# Patient Record
Sex: FEMALE | Race: WHITE | Employment: UNEMPLOYED | URBAN - NONMETROPOLITAN AREA
[De-identification: names, ages, dates, MRNs, and addresses within clinical notes are randomized per-mention and may not be internally consistent; named-entity substitution may affect disease eponyms.]

---

## 2019-08-26 ENCOUNTER — HOSPITAL ENCOUNTER (OUTPATIENT)
Dept: NURSING | Age: 29
Discharge: HOME | End: 2019-08-26
Payer: COMMERCIAL

## 2019-08-26 VITALS
RESPIRATION RATE: 16 BRPM | DIASTOLIC BLOOD PRESSURE: 91 MMHG | OXYGEN SATURATION: 99 % | BODY MASS INDEX: 30.9 KG/M2 | SYSTOLIC BLOOD PRESSURE: 126 MMHG | TEMPERATURE: 97.4 F | WEIGHT: 180 LBS | HEART RATE: 88 BPM

## 2019-08-26 LAB
ANTIBODY SCREEN: NORMAL
GESTATIONAL AGE(WEEKS): NORMAL

## 2019-08-26 PROCEDURE — 6360000002 HC RX W HCPCS: Performed by: OBSTETRICS & GYNECOLOGY

## 2019-08-26 PROCEDURE — 96372 THER/PROPH/DIAG INJ SC/IM: CPT

## 2019-08-26 PROCEDURE — 36415 COLL VENOUS BLD VENIPUNCTURE: CPT

## 2019-08-26 PROCEDURE — 86850 RBC ANTIBODY SCREEN: CPT

## 2019-08-26 RX ADMIN — HUMAN RHO(D) IMMUNE GLOBULIN 300 MCG: 300 INJECTION, SOLUTION INTRAMUSCULAR at 10:08

## 2019-08-26 NOTE — PROGRESS NOTES
6452 pt arrives to \[facility]\"" for Rhogam injection. All questions and concerns addressed.  28 weeks gestation confirmed  0915 PATIENT RIGHTS AND RESPONSIBILITIES SHEET OFFERED TO PT TO READ.  0919 lab notified of pt arrival  302 Lin Wang lab at bedside  1000 blood bank preparing medication  1015 _m___ Safety:       (Environmental)  Alber Ro[place] to environment   Ensure ID band is correct and in place/ allergy band as needed   Assess for fall risk   Initiate fall precautions as applicable (fall band, side rails, etc.)   Call light within reach   Bed in low position/ wheels locked    __m__ Pain:        Assess pain level and characteristics   Administer analgesics as ordered   Assess effectiveness of pain management and report to MD as needed    __m__ Knowledge Deficit:   Assess baseline knowledge   Provide teaching at level of understanding   Provide teaching via preferred learning method   Evaluate teaching effectiveness    __m__ Hemodynamic/Respiratory Status:       (Pre and Post Procedure Monitoring)   Assess/Monitor vital signs and LOC   Assess Baseline SpO2 prior to any sedation   Obtain weight/height   Assess vital signs/ LOC until patient meets discharge criteria   Monitor procedure site and notify MD of any issues   )  1015 WRITTEN DISCHARGE INSTRUCTIONS GIVEN TO PT-VERBALIZES UNDERSTANDING  1020 PT DISCHARGED AMBULATORY IN SATISFACTORY CONDITION

## 2019-10-20 ENCOUNTER — HOSPITAL ENCOUNTER (OUTPATIENT)
Age: 29
Discharge: HOME | End: 2019-10-20
Attending: OBSTETRICS & GYNECOLOGY | Admitting: OBSTETRICS & GYNECOLOGY
Payer: COMMERCIAL

## 2019-10-20 VITALS
HEIGHT: 64 IN | TEMPERATURE: 97.2 F | DIASTOLIC BLOOD PRESSURE: 77 MMHG | SYSTOLIC BLOOD PRESSURE: 126 MMHG | HEART RATE: 79 BPM | OXYGEN SATURATION: 97 % | BODY MASS INDEX: 32.1 KG/M2 | WEIGHT: 188 LBS | RESPIRATION RATE: 16 BRPM

## 2019-10-20 PROBLEM — O47.9 FALSE LABOR: Status: ACTIVE | Noted: 2019-10-20

## 2019-10-20 LAB
ALT SERPL-CCNC: 10 U/L (ref 11–66)
AST SERPL-CCNC: 11 U/L (ref 5–40)
BASOPHILS # BLD: 0.2 %
BASOPHILS ABSOLUTE: 0 THOU/MM3 (ref 0–0.1)
BUN BLDV-MCNC: 5 MG/DL (ref 7–22)
CREAT SERPL-MCNC: 0.4 MG/DL (ref 0.4–1.2)
CREATININE URINE: 144 MG/DL
EOSINOPHIL # BLD: 1.2 %
EOSINOPHILS ABSOLUTE: 0.2 THOU/MM3 (ref 0–0.4)
ERYTHROCYTE [DISTWIDTH] IN BLOOD BY AUTOMATED COUNT: 13.4 % (ref 11.5–14.5)
ERYTHROCYTE [DISTWIDTH] IN BLOOD BY AUTOMATED COUNT: 45.8 FL (ref 35–45)
GFR SERPL CREATININE-BSD FRML MDRD: > 90 ML/MIN/1.73M2
HCT VFR BLD CALC: 30.1 % (ref 37–47)
HEMOGLOBIN: 9.8 GM/DL (ref 12–16)
IMMATURE GRANS (ABS): 0.28 THOU/MM3 (ref 0–0.07)
IMMATURE GRANULOCYTES: 2 %
LYMPHOCYTES # BLD: 19.8 %
LYMPHOCYTES ABSOLUTE: 2.7 THOU/MM3 (ref 1–4.8)
MCH RBC QN AUTO: 30.7 PG (ref 26–33)
MCHC RBC AUTO-ENTMCNC: 32.6 GM/DL (ref 32.2–35.5)
MCV RBC AUTO: 94.4 FL (ref 81–99)
MONOCYTES # BLD: 5.3 %
MONOCYTES ABSOLUTE: 0.7 THOU/MM3 (ref 0.4–1.3)
NUCLEATED RED BLOOD CELLS: 0 /100 WBC
PLATELET # BLD: 254 THOU/MM3 (ref 130–400)
PMV BLD AUTO: 10 FL (ref 9.4–12.4)
PROT/CREAT RATIO, UR: 0.28
PROTEIN, URINE: 39.9 MG/DL
RBC # BLD: 3.19 MILL/MM3 (ref 4.2–5.4)
SEG NEUTROPHILS: 71.5 %
SEGMENTED NEUTROPHILS ABSOLUTE COUNT: 9.9 THOU/MM3 (ref 1.8–7.7)
WBC # BLD: 13.8 THOU/MM3 (ref 4.8–10.8)

## 2019-10-20 PROCEDURE — 36415 COLL VENOUS BLD VENIPUNCTURE: CPT

## 2019-10-20 PROCEDURE — 85025 COMPLETE CBC W/AUTO DIFF WBC: CPT

## 2019-10-20 PROCEDURE — 84450 TRANSFERASE (AST) (SGOT): CPT

## 2019-10-20 PROCEDURE — 84520 ASSAY OF UREA NITROGEN: CPT

## 2019-10-20 PROCEDURE — 84460 ALANINE AMINO (ALT) (SGPT): CPT

## 2019-10-20 PROCEDURE — 82570 ASSAY OF URINE CREATININE: CPT

## 2019-10-20 PROCEDURE — 82565 ASSAY OF CREATININE: CPT

## 2019-10-20 PROCEDURE — 84156 ASSAY OF PROTEIN URINE: CPT

## 2019-10-22 ENCOUNTER — NURSE ONLY (OUTPATIENT)
Dept: LAB | Age: 29
End: 2019-10-22

## 2019-10-23 LAB
GROUP B STREP CULTURE: ABNORMAL
ORGANISM: ABNORMAL

## 2019-10-28 ENCOUNTER — ANESTHESIA (OUTPATIENT)
Dept: LABOR AND DELIVERY | Age: 29
DRG: 560 | End: 2019-10-28
Payer: COMMERCIAL

## 2019-10-28 ENCOUNTER — HOSPITAL ENCOUNTER (INPATIENT)
Age: 29
LOS: 2 days | Discharge: HOME | DRG: 560 | End: 2019-10-30
Attending: OBSTETRICS & GYNECOLOGY | Admitting: OBSTETRICS & GYNECOLOGY
Payer: COMMERCIAL

## 2019-10-28 ENCOUNTER — ANESTHESIA EVENT (OUTPATIENT)
Dept: LABOR AND DELIVERY | Age: 29
DRG: 560 | End: 2019-10-28
Payer: COMMERCIAL

## 2019-10-28 LAB
ABO: NORMAL
ALBUMIN SERPL-MCNC: 3.4 G/DL (ref 3.5–5.1)
ALP BLD-CCNC: 133 U/L (ref 38–126)
ALT SERPL-CCNC: 10 U/L (ref 11–66)
AMPHETAMINE+METHAMPHETAMINE URINE SCREEN: NEGATIVE
ANION GAP SERPL CALCULATED.3IONS-SCNC: 15 MEQ/L (ref 8–16)
ANTIBODY SCREEN: NORMAL
AST SERPL-CCNC: 12 U/L (ref 5–40)
BARBITURATE QUANTITATIVE URINE: NEGATIVE
BASOPHILS # BLD: 0.3 %
BASOPHILS ABSOLUTE: 0 THOU/MM3 (ref 0–0.1)
BENZODIAZEPINE QUANTITATIVE URINE: NEGATIVE
BILIRUB SERPL-MCNC: 0.3 MG/DL (ref 0.3–1.2)
BUN BLDV-MCNC: 5 MG/DL (ref 7–22)
CALCIUM SERPL-MCNC: 9.1 MG/DL (ref 8.5–10.5)
CANNABINOID QUANTITATIVE URINE: NEGATIVE
CHLORIDE BLD-SCNC: 102 MEQ/L (ref 98–111)
CO2: 18 MEQ/L (ref 23–33)
COCAINE METABOLITE QUANTITATIVE URINE: NEGATIVE
CREAT SERPL-MCNC: 0.4 MG/DL (ref 0.4–1.2)
CREATININE URINE: 284.9 MG/DL
EOSINOPHIL # BLD: 0.8 %
EOSINOPHILS ABSOLUTE: 0.1 THOU/MM3 (ref 0–0.4)
ERYTHROCYTE [DISTWIDTH] IN BLOOD BY AUTOMATED COUNT: 13.6 % (ref 11.5–14.5)
ERYTHROCYTE [DISTWIDTH] IN BLOOD BY AUTOMATED COUNT: 46.5 FL (ref 35–45)
GFR SERPL CREATININE-BSD FRML MDRD: > 90 ML/MIN/1.73M2
GLUCOSE BLD-MCNC: 82 MG/DL (ref 70–108)
HCT VFR BLD CALC: 30.9 % (ref 37–47)
HEMOGLOBIN: 10 GM/DL (ref 12–16)
IMMATURE GRANS (ABS): 0.26 THOU/MM3 (ref 0–0.07)
IMMATURE GRANULOCYTES: 2.2 %
LYMPHOCYTES # BLD: 22.3 %
LYMPHOCYTES ABSOLUTE: 2.7 THOU/MM3 (ref 1–4.8)
MCH RBC QN AUTO: 30.6 PG (ref 26–33)
MCHC RBC AUTO-ENTMCNC: 32.4 GM/DL (ref 32.2–35.5)
MCV RBC AUTO: 94.5 FL (ref 81–99)
MONOCYTES # BLD: 5.6 %
MONOCYTES ABSOLUTE: 0.7 THOU/MM3 (ref 0.4–1.3)
NUCLEATED RED BLOOD CELLS: 0 /100 WBC
OPIATES, URINE: NEGATIVE
OXYCODONE: NEGATIVE
PHENCYCLIDINE QUANTITATIVE URINE: NEGATIVE
PLATELET # BLD: 276 THOU/MM3 (ref 130–400)
PMV BLD AUTO: 10 FL (ref 9.4–12.4)
POTASSIUM SERPL-SCNC: 3.6 MEQ/L (ref 3.5–5.2)
PROT/CREAT RATIO, UR: 0.84
PROTEIN, URINE: 240 MG/DL
RBC # BLD: 3.27 MILL/MM3 (ref 4.2–5.4)
RH FACTOR: NORMAL
RPR: NONREACTIVE
SEG NEUTROPHILS: 68.8 %
SEGMENTED NEUTROPHILS ABSOLUTE COUNT: 8.2 THOU/MM3 (ref 1.8–7.7)
SODIUM BLD-SCNC: 135 MEQ/L (ref 135–145)
TOTAL PROTEIN: 6.8 G/DL (ref 6.1–8)
URIC ACID: 4.3 MG/DL (ref 2.4–5.7)
WBC # BLD: 11.9 THOU/MM3 (ref 4.8–10.8)

## 2019-10-28 PROCEDURE — 80307 DRUG TEST PRSMV CHEM ANLYZR: CPT

## 2019-10-28 PROCEDURE — 2580000003 HC RX 258

## 2019-10-28 PROCEDURE — 1220000000 HC SEMI PRIVATE OB R&B

## 2019-10-28 PROCEDURE — 86901 BLOOD TYPING SEROLOGIC RH(D): CPT

## 2019-10-28 PROCEDURE — 3700000025 EPIDURAL BLOCK: Performed by: ANESTHESIOLOGY

## 2019-10-28 PROCEDURE — 2709999900 HC NON-CHARGEABLE SUPPLY

## 2019-10-28 PROCEDURE — 6360000002 HC RX W HCPCS: Performed by: OBSTETRICS & GYNECOLOGY

## 2019-10-28 PROCEDURE — 86592 SYPHILIS TEST NON-TREP QUAL: CPT

## 2019-10-28 PROCEDURE — 10907ZC DRAINAGE OF AMNIOTIC FLUID, THERAPEUTIC FROM PRODUCTS OF CONCEPTION, VIA NATURAL OR ARTIFICIAL OPENING: ICD-10-PCS | Performed by: OBSTETRICS & GYNECOLOGY

## 2019-10-28 PROCEDURE — 6360000002 HC RX W HCPCS

## 2019-10-28 PROCEDURE — 84156 ASSAY OF PROTEIN URINE: CPT

## 2019-10-28 PROCEDURE — 6370000000 HC RX 637 (ALT 250 FOR IP): Performed by: OBSTETRICS & GYNECOLOGY

## 2019-10-28 PROCEDURE — 36415 COLL VENOUS BLD VENIPUNCTURE: CPT

## 2019-10-28 PROCEDURE — 86900 BLOOD TYPING SEROLOGIC ABO: CPT

## 2019-10-28 PROCEDURE — 86850 RBC ANTIBODY SCREEN: CPT

## 2019-10-28 PROCEDURE — 2580000003 HC RX 258: Performed by: OBSTETRICS & GYNECOLOGY

## 2019-10-28 PROCEDURE — 85025 COMPLETE CBC W/AUTO DIFF WBC: CPT

## 2019-10-28 PROCEDURE — 82570 ASSAY OF URINE CREATININE: CPT

## 2019-10-28 PROCEDURE — 84550 ASSAY OF BLOOD/URIC ACID: CPT

## 2019-10-28 PROCEDURE — 7200000001 HC VAGINAL DELIVERY

## 2019-10-28 PROCEDURE — 80053 COMPREHEN METABOLIC PANEL: CPT

## 2019-10-28 RX ORDER — FERROUS SULFATE 325(65) MG
325 TABLET ORAL
Status: DISCONTINUED | OUTPATIENT
Start: 2019-10-29 | End: 2019-10-30 | Stop reason: HOSPADM

## 2019-10-28 RX ORDER — METHYLERGONOVINE MALEATE 0.2 MG/ML
200 INJECTION INTRAVENOUS
Status: ACTIVE | OUTPATIENT
Start: 2019-10-28 | End: 2019-10-28

## 2019-10-28 RX ORDER — SODIUM CHLORIDE 0.9 % (FLUSH) 0.9 %
10 SYRINGE (ML) INJECTION EVERY 12 HOURS SCHEDULED
Status: DISCONTINUED | OUTPATIENT
Start: 2019-10-28 | End: 2019-10-29

## 2019-10-28 RX ORDER — SEVOFLURANE 250 ML/250ML
1 LIQUID RESPIRATORY (INHALATION) CONTINUOUS PRN
Status: DISCONTINUED | OUTPATIENT
Start: 2019-10-28 | End: 2019-10-28 | Stop reason: HOSPADM

## 2019-10-28 RX ORDER — DIPHENHYDRAMINE HCL 25 MG
50 TABLET ORAL EVERY 6 HOURS PRN
Status: DISCONTINUED | OUTPATIENT
Start: 2019-10-28 | End: 2019-10-30 | Stop reason: HOSPADM

## 2019-10-28 RX ORDER — DOCUSATE SODIUM 100 MG/1
100 CAPSULE, LIQUID FILLED ORAL 2 TIMES DAILY PRN
Status: DISCONTINUED | OUTPATIENT
Start: 2019-10-28 | End: 2019-10-30 | Stop reason: HOSPADM

## 2019-10-28 RX ORDER — MISOPROSTOL 200 UG/1
800 TABLET ORAL
Status: ACTIVE | OUTPATIENT
Start: 2019-10-28 | End: 2019-10-28

## 2019-10-28 RX ORDER — ONDANSETRON 4 MG/1
8 TABLET, FILM COATED ORAL EVERY 8 HOURS PRN
Status: DISCONTINUED | OUTPATIENT
Start: 2019-10-28 | End: 2019-10-30 | Stop reason: HOSPADM

## 2019-10-28 RX ORDER — LANOLIN 100 %
OINTMENT (GRAM) TOPICAL PRN
Status: DISCONTINUED | OUTPATIENT
Start: 2019-10-28 | End: 2019-10-30 | Stop reason: HOSPADM

## 2019-10-28 RX ORDER — SODIUM CHLORIDE 0.9 % (FLUSH) 0.9 %
10 SYRINGE (ML) INJECTION PRN
Status: DISCONTINUED | OUTPATIENT
Start: 2019-10-28 | End: 2019-10-30 | Stop reason: HOSPADM

## 2019-10-28 RX ORDER — LIDOCAINE HYDROCHLORIDE 10 MG/ML
30 INJECTION, SOLUTION EPIDURAL; INFILTRATION; INTRACAUDAL; PERINEURAL PRN
Status: DISCONTINUED | OUTPATIENT
Start: 2019-10-28 | End: 2019-10-28 | Stop reason: HOSPADM

## 2019-10-28 RX ORDER — MORPHINE SULFATE 2 MG/ML
2 INJECTION, SOLUTION INTRAMUSCULAR; INTRAVENOUS
Status: DISCONTINUED | OUTPATIENT
Start: 2019-10-28 | End: 2019-10-28 | Stop reason: HOSPADM

## 2019-10-28 RX ORDER — TERBUTALINE SULFATE 1 MG/ML
0.25 INJECTION, SOLUTION SUBCUTANEOUS
Status: DISCONTINUED | OUTPATIENT
Start: 2019-10-28 | End: 2019-10-28 | Stop reason: HOSPADM

## 2019-10-28 RX ORDER — SODIUM CHLORIDE, SODIUM LACTATE, POTASSIUM CHLORIDE, CALCIUM CHLORIDE 600; 310; 30; 20 MG/100ML; MG/100ML; MG/100ML; MG/100ML
INJECTION, SOLUTION INTRAVENOUS CONTINUOUS
Status: DISCONTINUED | OUTPATIENT
Start: 2019-10-28 | End: 2019-10-28

## 2019-10-28 RX ORDER — NALOXONE HYDROCHLORIDE 0.4 MG/ML
0.4 INJECTION, SOLUTION INTRAMUSCULAR; INTRAVENOUS; SUBCUTANEOUS PRN
Status: DISCONTINUED | OUTPATIENT
Start: 2019-10-28 | End: 2019-10-28 | Stop reason: HOSPADM

## 2019-10-28 RX ORDER — HYDROCORTISONE ACETATE 25 MG/1
25 SUPPOSITORY RECTAL 2 TIMES DAILY PRN
Status: DISCONTINUED | OUTPATIENT
Start: 2019-10-28 | End: 2019-10-30 | Stop reason: HOSPADM

## 2019-10-28 RX ORDER — ACETAMINOPHEN 325 MG/1
650 TABLET ORAL EVERY 4 HOURS PRN
Status: DISCONTINUED | OUTPATIENT
Start: 2019-10-28 | End: 2019-10-30 | Stop reason: HOSPADM

## 2019-10-28 RX ORDER — MORPHINE SULFATE 4 MG/ML
4 INJECTION, SOLUTION INTRAMUSCULAR; INTRAVENOUS
Status: DISCONTINUED | OUTPATIENT
Start: 2019-10-28 | End: 2019-10-30 | Stop reason: HOSPADM

## 2019-10-28 RX ORDER — ONDANSETRON 2 MG/ML
4 INJECTION INTRAMUSCULAR; INTRAVENOUS EVERY 6 HOURS PRN
Status: DISCONTINUED | OUTPATIENT
Start: 2019-10-28 | End: 2019-10-28 | Stop reason: HOSPADM

## 2019-10-28 RX ORDER — PENICILLIN G POTASSIUM 5000000 [IU]/1
INJECTION, POWDER, FOR SOLUTION INTRAMUSCULAR; INTRAVENOUS
Status: DISCONTINUED
Start: 2019-10-28 | End: 2019-10-28

## 2019-10-28 RX ORDER — SODIUM CHLORIDE, SODIUM LACTATE, POTASSIUM CHLORIDE, CALCIUM CHLORIDE 600; 310; 30; 20 MG/100ML; MG/100ML; MG/100ML; MG/100ML
INJECTION, SOLUTION INTRAVENOUS CONTINUOUS
Status: DISCONTINUED | OUTPATIENT
Start: 2019-10-28 | End: 2019-10-30 | Stop reason: HOSPADM

## 2019-10-28 RX ORDER — BUTORPHANOL TARTRATE 1 MG/ML
1 INJECTION, SOLUTION INTRAMUSCULAR; INTRAVENOUS
Status: DISCONTINUED | OUTPATIENT
Start: 2019-10-28 | End: 2019-10-28 | Stop reason: HOSPADM

## 2019-10-28 RX ORDER — ZOLPIDEM TARTRATE 10 MG/1
10 TABLET ORAL NIGHTLY PRN
Status: DISCONTINUED | OUTPATIENT
Start: 2019-10-28 | End: 2019-10-30 | Stop reason: HOSPADM

## 2019-10-28 RX ORDER — ONDANSETRON 2 MG/ML
4 INJECTION INTRAMUSCULAR; INTRAVENOUS EVERY 6 HOURS PRN
Status: DISCONTINUED | OUTPATIENT
Start: 2019-10-28 | End: 2019-10-30 | Stop reason: HOSPADM

## 2019-10-28 RX ORDER — ACETAMINOPHEN 325 MG/1
650 TABLET ORAL EVERY 4 HOURS PRN
Status: DISCONTINUED | OUTPATIENT
Start: 2019-10-28 | End: 2019-10-28 | Stop reason: HOSPADM

## 2019-10-28 RX ORDER — IBUPROFEN 800 MG/1
800 TABLET ORAL EVERY 8 HOURS PRN
Status: DISCONTINUED | OUTPATIENT
Start: 2019-10-28 | End: 2019-10-28 | Stop reason: SDUPTHER

## 2019-10-28 RX ORDER — CARBOPROST TROMETHAMINE 250 UG/ML
250 INJECTION, SOLUTION INTRAMUSCULAR
Status: ACTIVE | OUTPATIENT
Start: 2019-10-28 | End: 2019-10-28

## 2019-10-28 RX ORDER — HYDROCODONE BITARTRATE AND ACETAMINOPHEN 5; 325 MG/1; MG/1
1 TABLET ORAL EVERY 4 HOURS PRN
Status: DISCONTINUED | OUTPATIENT
Start: 2019-10-28 | End: 2019-10-30 | Stop reason: HOSPADM

## 2019-10-28 RX ORDER — MORPHINE SULFATE 4 MG/ML
4 INJECTION, SOLUTION INTRAMUSCULAR; INTRAVENOUS
Status: DISCONTINUED | OUTPATIENT
Start: 2019-10-28 | End: 2019-10-28 | Stop reason: HOSPADM

## 2019-10-28 RX ORDER — MISOPROSTOL 200 UG/1
1000 TABLET ORAL PRN
Status: DISCONTINUED | OUTPATIENT
Start: 2019-10-28 | End: 2019-10-28 | Stop reason: HOSPADM

## 2019-10-28 RX ORDER — IBUPROFEN 800 MG/1
800 TABLET ORAL 3 TIMES DAILY
Status: DISCONTINUED | OUTPATIENT
Start: 2019-10-28 | End: 2019-10-30 | Stop reason: HOSPADM

## 2019-10-28 RX ORDER — NALBUPHINE HCL 10 MG/ML
5 AMPUL (ML) INJECTION EVERY 4 HOURS PRN
Status: DISCONTINUED | OUTPATIENT
Start: 2019-10-28 | End: 2019-10-28 | Stop reason: HOSPADM

## 2019-10-28 RX ORDER — MORPHINE SULFATE 2 MG/ML
2 INJECTION, SOLUTION INTRAMUSCULAR; INTRAVENOUS
Status: DISCONTINUED | OUTPATIENT
Start: 2019-10-28 | End: 2019-10-30 | Stop reason: HOSPADM

## 2019-10-28 RX ORDER — METHYLERGONOVINE MALEATE 0.2 MG/ML
200 INJECTION INTRAVENOUS PRN
Status: DISCONTINUED | OUTPATIENT
Start: 2019-10-28 | End: 2019-10-28 | Stop reason: HOSPADM

## 2019-10-28 RX ORDER — DIPHENHYDRAMINE HYDROCHLORIDE 50 MG/ML
25 INJECTION INTRAMUSCULAR; INTRAVENOUS EVERY 4 HOURS PRN
Status: DISCONTINUED | OUTPATIENT
Start: 2019-10-28 | End: 2019-10-28 | Stop reason: HOSPADM

## 2019-10-28 RX ORDER — HYDROCODONE BITARTRATE AND ACETAMINOPHEN 5; 325 MG/1; MG/1
2 TABLET ORAL EVERY 4 HOURS PRN
Status: DISCONTINUED | OUTPATIENT
Start: 2019-10-28 | End: 2019-10-30 | Stop reason: HOSPADM

## 2019-10-28 RX ORDER — ONDANSETRON 2 MG/ML
8 INJECTION INTRAMUSCULAR; INTRAVENOUS EVERY 6 HOURS PRN
Status: DISCONTINUED | OUTPATIENT
Start: 2019-10-28 | End: 2019-10-28 | Stop reason: HOSPADM

## 2019-10-28 RX ADMIN — ACETAMINOPHEN 650 MG: 325 TABLET ORAL at 18:27

## 2019-10-28 RX ADMIN — SODIUM CHLORIDE, POTASSIUM CHLORIDE, SODIUM LACTATE AND CALCIUM CHLORIDE: 600; 310; 30; 20 INJECTION, SOLUTION INTRAVENOUS at 13:45

## 2019-10-28 RX ADMIN — SODIUM CHLORIDE, POTASSIUM CHLORIDE, SODIUM LACTATE AND CALCIUM CHLORIDE: 600; 310; 30; 20 INJECTION, SOLUTION INTRAVENOUS at 11:09

## 2019-10-28 RX ADMIN — Medication 1 MILLI-UNITS/MIN: at 11:34

## 2019-10-28 RX ADMIN — IBUPROFEN 800 MG: 800 TABLET, FILM COATED ORAL at 21:26

## 2019-10-28 RX ADMIN — DEXTROSE MONOHYDRATE 5 MILLION UNITS: 5 INJECTION INTRAVENOUS at 11:38

## 2019-10-28 ASSESSMENT — PAIN SCALES - GENERAL
PAINLEVEL_OUTOF10: 2
PAINLEVEL_OUTOF10: 5
PAINLEVEL_OUTOF10: 7
PAINLEVEL_OUTOF10: 6

## 2019-10-28 ASSESSMENT — PAIN DESCRIPTION - LOCATION: LOCATION: ABDOMEN

## 2019-10-28 ASSESSMENT — PAIN DESCRIPTION - ORIENTATION: ORIENTATION: LOWER

## 2019-10-28 ASSESSMENT — PAIN DESCRIPTION - PAIN TYPE: TYPE: ACUTE PAIN

## 2019-10-28 ASSESSMENT — PAIN - FUNCTIONAL ASSESSMENT: PAIN_FUNCTIONAL_ASSESSMENT: ACTIVITIES ARE NOT PREVENTED

## 2019-10-28 ASSESSMENT — PAIN DESCRIPTION - DESCRIPTORS: DESCRIPTORS: CRAMPING

## 2019-10-28 ASSESSMENT — PAIN DESCRIPTION - FREQUENCY: FREQUENCY: INTERMITTENT

## 2019-10-29 LAB
ABO: NORMAL
ANTIBODY SCREEN: NORMAL
FETAL SCREEN: NORMAL
GESTATIONAL AGE(WEEKS): NORMAL
RH FACTOR: NORMAL

## 2019-10-29 PROCEDURE — 86850 RBC ANTIBODY SCREEN: CPT

## 2019-10-29 PROCEDURE — 86901 BLOOD TYPING SEROLOGIC RH(D): CPT

## 2019-10-29 PROCEDURE — 1220000000 HC SEMI PRIVATE OB R&B

## 2019-10-29 PROCEDURE — 6360000002 HC RX W HCPCS: Performed by: OBSTETRICS & GYNECOLOGY

## 2019-10-29 PROCEDURE — 86900 BLOOD TYPING SEROLOGIC ABO: CPT

## 2019-10-29 PROCEDURE — 2709999900 HC NON-CHARGEABLE SUPPLY

## 2019-10-29 PROCEDURE — 36415 COLL VENOUS BLD VENIPUNCTURE: CPT

## 2019-10-29 PROCEDURE — 85461 HEMOGLOBIN FETAL: CPT

## 2019-10-29 PROCEDURE — 6370000000 HC RX 637 (ALT 250 FOR IP): Performed by: OBSTETRICS & GYNECOLOGY

## 2019-10-29 RX ORDER — CARBOPROST TROMETHAMINE 250 UG/ML
250 INJECTION, SOLUTION INTRAMUSCULAR PRN
Status: DISCONTINUED | OUTPATIENT
Start: 2019-10-29 | End: 2019-10-30 | Stop reason: HOSPADM

## 2019-10-29 RX ADMIN — HUMAN RHO(D) IMMUNE GLOBULIN 300 MCG: 300 INJECTION, SOLUTION INTRAMUSCULAR at 17:12

## 2019-10-29 RX ADMIN — IBUPROFEN 800 MG: 800 TABLET, FILM COATED ORAL at 17:16

## 2019-10-29 RX ADMIN — IBUPROFEN 800 MG: 800 TABLET, FILM COATED ORAL at 08:27

## 2019-10-29 RX ADMIN — DOCUSATE SODIUM 100 MG: 100 CAPSULE, LIQUID FILLED ORAL at 20:22

## 2019-10-29 RX ADMIN — ACETAMINOPHEN 650 MG: 325 TABLET ORAL at 23:33

## 2019-10-29 RX ADMIN — ACETAMINOPHEN 650 MG: 325 TABLET ORAL at 03:44

## 2019-10-29 ASSESSMENT — PAIN SCALES - GENERAL
PAINLEVEL_OUTOF10: 6
PAINLEVEL_OUTOF10: 7
PAINLEVEL_OUTOF10: 6
PAINLEVEL_OUTOF10: 7
PAINLEVEL_OUTOF10: 4
PAINLEVEL_OUTOF10: 7
PAINLEVEL_OUTOF10: 7
PAINLEVEL_OUTOF10: 8
PAINLEVEL_OUTOF10: 3

## 2019-10-29 ASSESSMENT — PAIN DESCRIPTION - ORIENTATION: ORIENTATION: LOWER

## 2019-10-29 ASSESSMENT — PAIN - FUNCTIONAL ASSESSMENT: PAIN_FUNCTIONAL_ASSESSMENT: ACTIVITIES ARE NOT PREVENTED

## 2019-10-29 ASSESSMENT — PAIN DESCRIPTION - FREQUENCY: FREQUENCY: CONTINUOUS

## 2019-10-29 ASSESSMENT — PAIN DESCRIPTION - LOCATION: LOCATION: ABDOMEN

## 2019-10-29 ASSESSMENT — PAIN DESCRIPTION - ONSET: ONSET: ON-GOING

## 2019-10-29 ASSESSMENT — PAIN DESCRIPTION - PROGRESSION: CLINICAL_PROGRESSION: NOT CHANGED

## 2019-10-29 ASSESSMENT — PAIN DESCRIPTION - DESCRIPTORS: DESCRIPTORS: CRAMPING

## 2019-10-29 ASSESSMENT — PAIN DESCRIPTION - PAIN TYPE: TYPE: ACUTE PAIN

## 2019-10-30 VITALS
TEMPERATURE: 98.5 F | DIASTOLIC BLOOD PRESSURE: 80 MMHG | HEIGHT: 64 IN | BODY MASS INDEX: 32.44 KG/M2 | RESPIRATION RATE: 17 BRPM | WEIGHT: 190 LBS | SYSTOLIC BLOOD PRESSURE: 137 MMHG | OXYGEN SATURATION: 98 % | HEART RATE: 77 BPM

## 2019-10-30 PROCEDURE — 6370000000 HC RX 637 (ALT 250 FOR IP): Performed by: OBSTETRICS & GYNECOLOGY

## 2019-10-30 RX ORDER — IBUPROFEN 200 MG
800 TABLET ORAL EVERY 8 HOURS PRN
COMMUNITY
Start: 2019-10-30 | End: 2020-09-29 | Stop reason: ALTCHOICE

## 2019-10-30 RX ORDER — PSEUDOEPHEDRINE HCL 30 MG
100 TABLET ORAL 2 TIMES DAILY PRN
COMMUNITY
Start: 2019-10-30 | End: 2020-09-29 | Stop reason: ALTCHOICE

## 2019-10-30 RX ADMIN — IBUPROFEN 800 MG: 800 TABLET, FILM COATED ORAL at 05:14

## 2019-10-30 ASSESSMENT — PAIN SCALES - GENERAL: PAINLEVEL_OUTOF10: 7

## 2020-09-29 ENCOUNTER — HOSPITAL ENCOUNTER (OUTPATIENT)
Dept: NURSING | Age: 30
Discharge: HOME | End: 2020-09-29
Payer: COMMERCIAL

## 2020-09-29 VITALS
OXYGEN SATURATION: 98 % | DIASTOLIC BLOOD PRESSURE: 91 MMHG | TEMPERATURE: 97.7 F | RESPIRATION RATE: 16 BRPM | HEART RATE: 89 BPM | SYSTOLIC BLOOD PRESSURE: 127 MMHG

## 2020-09-29 LAB
ANTIBODY SCREEN: NORMAL
GESTATIONAL AGE(WEEKS): NORMAL

## 2020-09-29 PROCEDURE — 36415 COLL VENOUS BLD VENIPUNCTURE: CPT

## 2020-09-29 PROCEDURE — 6360000002 HC RX W HCPCS: Performed by: OBSTETRICS & GYNECOLOGY

## 2020-09-29 PROCEDURE — 86850 RBC ANTIBODY SCREEN: CPT

## 2020-09-29 PROCEDURE — 96372 THER/PROPH/DIAG INJ SC/IM: CPT

## 2020-09-29 RX ADMIN — HUMAN RHO(D) IMMUNE GLOBULIN 300 MCG: 300 INJECTION, SOLUTION INTRAMUSCULAR at 12:10

## 2020-09-29 ASSESSMENT — PAIN - FUNCTIONAL ASSESSMENT: PAIN_FUNCTIONAL_ASSESSMENT: 0-10

## 2020-09-29 NOTE — PROGRESS NOTES
[place] INJECTION PROCEDURE REVIEWED WITH PT VERBALIZED UNDERSTANDING. Pt rights and responsibilities offered to pt to read. _MET___ Safety:       (Environmental)   [place] to environment   Ensure ID band is correct and in place/ allergy band as needed   Assess for fall risk   Initiate fall precautions as applicable (fall band, side rails, etc.)   Call light within reach   Bed in low position/ wheels locked    __MET__ Pain:        Assess pain level and characteristics   Administer analgesics as ordered   Assess effectiveness of pain management and report to MD as needed    __MET__ Knowledge Deficit:   Assess baseline knowledge   Provide teaching at level of understanding   Provide teaching via preferred learning method   Evaluate teaching effectiveness    _MET___ Hemodynamic/Respiratory Status:        (PAssess vital signs/ LOC until patient meets discharge criteria   Monitor procedure site and notify MD of any issues    ___  [place] [address] HOME INSTRUCTIONS TO PT VERBALIZED UNDERSTANDING. [address] WITH FAMILY.

## 2020-10-16 ENCOUNTER — ANESTHESIA (OUTPATIENT)
Dept: LABOR AND DELIVERY | Age: 30
DRG: 560 | End: 2020-10-16
Payer: COMMERCIAL

## 2020-10-16 ENCOUNTER — ANESTHESIA EVENT (OUTPATIENT)
Dept: LABOR AND DELIVERY | Age: 30
DRG: 560 | End: 2020-10-16
Payer: COMMERCIAL

## 2020-10-16 ENCOUNTER — HOSPITAL ENCOUNTER (INPATIENT)
Age: 30
LOS: 1 days | Discharge: HOME | DRG: 560 | End: 2020-10-17
Attending: OBSTETRICS & GYNECOLOGY | Admitting: OBSTETRICS & GYNECOLOGY
Payer: COMMERCIAL

## 2020-10-16 PROBLEM — Z34.90 ENCOUNTER FOR INDUCTION OF LABOR: Status: ACTIVE | Noted: 2020-10-16

## 2020-10-16 LAB
ABO CHECK: NORMAL
ALBUMIN SERPL-MCNC: 3.6 G/DL (ref 3.5–5.1)
ALP BLD-CCNC: 134 U/L (ref 38–126)
ALT SERPL-CCNC: 16 U/L (ref 11–66)
AMPHETAMINE+METHAMPHETAMINE URINE SCREEN: NEGATIVE
ANION GAP SERPL CALCULATED.3IONS-SCNC: 13 MEQ/L (ref 8–16)
AST SERPL-CCNC: 18 U/L (ref 5–40)
BARBITURATE QUANTITATIVE URINE: NEGATIVE
BENZODIAZEPINE QUANTITATIVE URINE: NEGATIVE
BILIRUB SERPL-MCNC: 0.3 MG/DL (ref 0.3–1.2)
BUN BLDV-MCNC: 7 MG/DL (ref 7–22)
CALCIUM SERPL-MCNC: 9 MG/DL (ref 8.5–10.5)
CANNABINOID QUANTITATIVE URINE: NEGATIVE
CHLORIDE BLD-SCNC: 102 MEQ/L (ref 98–111)
CO2: 18 MEQ/L (ref 23–33)
COCAINE METABOLITE QUANTITATIVE URINE: NEGATIVE
CREAT SERPL-MCNC: 0.4 MG/DL (ref 0.4–1.2)
CREATININE URINE: 165.5 MG/DL
ERYTHROCYTE [DISTWIDTH] IN BLOOD BY AUTOMATED COUNT: 15.2 % (ref 11.5–14.5)
ERYTHROCYTE [DISTWIDTH] IN BLOOD BY AUTOMATED COUNT: 47.4 FL (ref 35–45)
GFR SERPL CREATININE-BSD FRML MDRD: > 90 ML/MIN/1.73M2
GLUCOSE BLD-MCNC: 93 MG/DL (ref 70–108)
HCT VFR BLD CALC: 26.7 % (ref 37–47)
HEMOGLOBIN: 8.4 GM/DL (ref 12–16)
MCH RBC QN AUTO: 27 PG (ref 26–33)
MCHC RBC AUTO-ENTMCNC: 31.5 GM/DL (ref 32.2–35.5)
MCV RBC AUTO: 85.9 FL (ref 81–99)
OPIATES, URINE: NEGATIVE
OXYCODONE: NEGATIVE
PHENCYCLIDINE QUANTITATIVE URINE: NEGATIVE
PLATELET # BLD: 306 THOU/MM3 (ref 130–400)
PMV BLD AUTO: 10.3 FL (ref 9.4–12.4)
POTASSIUM SERPL-SCNC: 3.4 MEQ/L (ref 3.5–5.2)
PROT/CREAT RATIO, UR: 0.29
PROTEIN, URINE: 47.6 MG/DL
RBC # BLD: 3.11 MILL/MM3 (ref 4.2–5.4)
RH FACTOR: NORMAL
RPR: NONREACTIVE
SELECTED GEL ANTIBODY SCREEN: NORMAL
SODIUM BLD-SCNC: 133 MEQ/L (ref 135–145)
TOTAL PROTEIN: 7 G/DL (ref 6.1–8)
WBC # BLD: 14 THOU/MM3 (ref 4.8–10.8)

## 2020-10-16 PROCEDURE — 3700000025 EPIDURAL BLOCK: Performed by: ANESTHESIOLOGY

## 2020-10-16 PROCEDURE — 7200000001 HC VAGINAL DELIVERY

## 2020-10-16 PROCEDURE — 82570 ASSAY OF URINE CREATININE: CPT

## 2020-10-16 PROCEDURE — 6360000002 HC RX W HCPCS

## 2020-10-16 PROCEDURE — 86885 COOMBS TEST INDIRECT QUAL: CPT

## 2020-10-16 PROCEDURE — 86901 BLOOD TYPING SEROLOGIC RH(D): CPT

## 2020-10-16 PROCEDURE — 80307 DRUG TEST PRSMV CHEM ANLYZR: CPT

## 2020-10-16 PROCEDURE — 10907ZC DRAINAGE OF AMNIOTIC FLUID, THERAPEUTIC FROM PRODUCTS OF CONCEPTION, VIA NATURAL OR ARTIFICIAL OPENING: ICD-10-PCS | Performed by: OBSTETRICS & GYNECOLOGY

## 2020-10-16 PROCEDURE — 80053 COMPREHEN METABOLIC PANEL: CPT

## 2020-10-16 PROCEDURE — 85027 COMPLETE CBC AUTOMATED: CPT

## 2020-10-16 PROCEDURE — 84156 ASSAY OF PROTEIN URINE: CPT

## 2020-10-16 PROCEDURE — 36415 COLL VENOUS BLD VENIPUNCTURE: CPT

## 2020-10-16 PROCEDURE — 2500000003 HC RX 250 WO HCPCS: Performed by: ANESTHESIOLOGY

## 2020-10-16 PROCEDURE — 3E033VJ INTRODUCTION OF OTHER HORMONE INTO PERIPHERAL VEIN, PERCUTANEOUS APPROACH: ICD-10-PCS | Performed by: OBSTETRICS & GYNECOLOGY

## 2020-10-16 PROCEDURE — 86592 SYPHILIS TEST NON-TREP QUAL: CPT

## 2020-10-16 PROCEDURE — 86900 BLOOD TYPING SEROLOGIC ABO: CPT

## 2020-10-16 PROCEDURE — 2580000003 HC RX 258: Performed by: OBSTETRICS & GYNECOLOGY

## 2020-10-16 PROCEDURE — 6360000002 HC RX W HCPCS: Performed by: OBSTETRICS & GYNECOLOGY

## 2020-10-16 PROCEDURE — 6370000000 HC RX 637 (ALT 250 FOR IP): Performed by: OBSTETRICS & GYNECOLOGY

## 2020-10-16 PROCEDURE — 1200000000 HC SEMI PRIVATE

## 2020-10-16 RX ORDER — CARBOPROST TROMETHAMINE 250 UG/ML
250 INJECTION, SOLUTION INTRAMUSCULAR
Status: DISPENSED | OUTPATIENT
Start: 2020-10-16 | End: 2020-10-16

## 2020-10-16 RX ORDER — CARBOPROST TROMETHAMINE 250 UG/ML
250 INJECTION, SOLUTION INTRAMUSCULAR PRN
Status: DISCONTINUED | OUTPATIENT
Start: 2020-10-16 | End: 2020-10-16

## 2020-10-16 RX ORDER — MISOPROSTOL 200 UG/1
1000 TABLET ORAL PRN
Status: DISCONTINUED | OUTPATIENT
Start: 2020-10-16 | End: 2020-10-16

## 2020-10-16 RX ORDER — ONDANSETRON 2 MG/ML
8 INJECTION INTRAMUSCULAR; INTRAVENOUS EVERY 6 HOURS PRN
Status: DISCONTINUED | OUTPATIENT
Start: 2020-10-16 | End: 2020-10-16

## 2020-10-16 RX ORDER — DOCUSATE SODIUM 100 MG/1
100 CAPSULE, LIQUID FILLED ORAL 2 TIMES DAILY PRN
Status: DISCONTINUED | OUTPATIENT
Start: 2020-10-16 | End: 2020-10-16

## 2020-10-16 RX ORDER — BUTORPHANOL TARTRATE 1 MG/ML
1 INJECTION, SOLUTION INTRAMUSCULAR; INTRAVENOUS
Status: DISCONTINUED | OUTPATIENT
Start: 2020-10-16 | End: 2020-10-16

## 2020-10-16 RX ORDER — FERROUS SULFATE 325(65) MG
325 TABLET ORAL
Status: DISCONTINUED | OUTPATIENT
Start: 2020-10-17 | End: 2020-10-17 | Stop reason: HOSPADM

## 2020-10-16 RX ORDER — TERBUTALINE SULFATE 1 MG/ML
0.25 INJECTION, SOLUTION SUBCUTANEOUS ONCE
Status: DISCONTINUED | OUTPATIENT
Start: 2020-10-16 | End: 2020-10-16

## 2020-10-16 RX ORDER — METHYLERGONOVINE MALEATE 0.2 MG/ML
200 INJECTION INTRAVENOUS SEE ADMIN INSTRUCTIONS
Status: DISCONTINUED | OUTPATIENT
Start: 2020-10-16 | End: 2020-10-17 | Stop reason: HOSPADM

## 2020-10-16 RX ORDER — MODIFIED LANOLIN
OINTMENT (GRAM) TOPICAL PRN
Status: DISCONTINUED | OUTPATIENT
Start: 2020-10-16 | End: 2020-10-17 | Stop reason: HOSPADM

## 2020-10-16 RX ORDER — ONDANSETRON 2 MG/ML
4 INJECTION INTRAMUSCULAR; INTRAVENOUS EVERY 6 HOURS PRN
Status: DISCONTINUED | OUTPATIENT
Start: 2020-10-16 | End: 2020-10-16

## 2020-10-16 RX ORDER — BISACODYL 10 MG
10 SUPPOSITORY, RECTAL RECTAL DAILY PRN
Status: DISCONTINUED | OUTPATIENT
Start: 2020-10-16 | End: 2020-10-17 | Stop reason: HOSPADM

## 2020-10-16 RX ORDER — SODIUM CHLORIDE 0.9 % (FLUSH) 0.9 %
10 SYRINGE (ML) INJECTION PRN
Status: DISCONTINUED | OUTPATIENT
Start: 2020-10-16 | End: 2020-10-17 | Stop reason: HOSPADM

## 2020-10-16 RX ORDER — SODIUM CHLORIDE, SODIUM LACTATE, POTASSIUM CHLORIDE, CALCIUM CHLORIDE 600; 310; 30; 20 MG/100ML; MG/100ML; MG/100ML; MG/100ML
INJECTION, SOLUTION INTRAVENOUS CONTINUOUS
Status: DISCONTINUED | OUTPATIENT
Start: 2020-10-16 | End: 2020-10-16

## 2020-10-16 RX ORDER — ACETAMINOPHEN 325 MG/1
650 TABLET ORAL EVERY 4 HOURS PRN
Status: DISCONTINUED | OUTPATIENT
Start: 2020-10-16 | End: 2020-10-17 | Stop reason: HOSPADM

## 2020-10-16 RX ORDER — SODIUM CHLORIDE 0.9 % (FLUSH) 0.9 %
10 SYRINGE (ML) INJECTION EVERY 12 HOURS SCHEDULED
Status: DISCONTINUED | OUTPATIENT
Start: 2020-10-16 | End: 2020-10-17 | Stop reason: HOSPADM

## 2020-10-16 RX ORDER — NALBUPHINE HCL 10 MG/ML
5 AMPUL (ML) INJECTION EVERY 4 HOURS PRN
Status: DISCONTINUED | OUTPATIENT
Start: 2020-10-16 | End: 2020-10-16 | Stop reason: RX

## 2020-10-16 RX ORDER — DIPHENHYDRAMINE HYDROCHLORIDE 50 MG/ML
25 INJECTION INTRAMUSCULAR; INTRAVENOUS EVERY 4 HOURS PRN
Status: DISCONTINUED | OUTPATIENT
Start: 2020-10-16 | End: 2020-10-16

## 2020-10-16 RX ORDER — HYDROCODONE BITARTRATE AND ACETAMINOPHEN 5; 325 MG/1; MG/1
1 TABLET ORAL EVERY 4 HOURS PRN
Status: DISCONTINUED | OUTPATIENT
Start: 2020-10-16 | End: 2020-10-17 | Stop reason: HOSPADM

## 2020-10-16 RX ORDER — ONDANSETRON 4 MG/1
8 TABLET, ORALLY DISINTEGRATING ORAL EVERY 8 HOURS PRN
Status: DISCONTINUED | OUTPATIENT
Start: 2020-10-16 | End: 2020-10-17 | Stop reason: HOSPADM

## 2020-10-16 RX ORDER — DOCUSATE SODIUM 100 MG/1
100 CAPSULE, LIQUID FILLED ORAL 2 TIMES DAILY PRN
Status: DISCONTINUED | OUTPATIENT
Start: 2020-10-16 | End: 2020-10-17 | Stop reason: HOSPADM

## 2020-10-16 RX ORDER — IBUPROFEN 800 MG/1
800 TABLET ORAL EVERY 8 HOURS PRN
Status: DISCONTINUED | OUTPATIENT
Start: 2020-10-16 | End: 2020-10-16

## 2020-10-16 RX ORDER — SODIUM CHLORIDE, SODIUM LACTATE, POTASSIUM CHLORIDE, CALCIUM CHLORIDE 600; 310; 30; 20 MG/100ML; MG/100ML; MG/100ML; MG/100ML
INJECTION, SOLUTION INTRAVENOUS CONTINUOUS
Status: DISCONTINUED | OUTPATIENT
Start: 2020-10-16 | End: 2020-10-17 | Stop reason: HOSPADM

## 2020-10-16 RX ORDER — LIDOCAINE HYDROCHLORIDE 10 MG/ML
30 INJECTION, SOLUTION EPIDURAL; INFILTRATION; INTRACAUDAL; PERINEURAL PRN
Status: DISCONTINUED | OUTPATIENT
Start: 2020-10-16 | End: 2020-10-16

## 2020-10-16 RX ORDER — IBUPROFEN 800 MG/1
800 TABLET ORAL EVERY 8 HOURS SCHEDULED
Status: DISCONTINUED | OUTPATIENT
Start: 2020-10-16 | End: 2020-10-17 | Stop reason: HOSPADM

## 2020-10-16 RX ORDER — MISOPROSTOL 200 UG/1
1000 TABLET ORAL PRN
Status: DISCONTINUED | OUTPATIENT
Start: 2020-10-16 | End: 2020-10-17 | Stop reason: HOSPADM

## 2020-10-16 RX ORDER — NALOXONE HYDROCHLORIDE 0.4 MG/ML
0.4 INJECTION, SOLUTION INTRAMUSCULAR; INTRAVENOUS; SUBCUTANEOUS PRN
Status: DISCONTINUED | OUTPATIENT
Start: 2020-10-16 | End: 2020-10-16

## 2020-10-16 RX ORDER — PENICILLIN G 3000000 [IU]/50ML
3 INJECTION, SOLUTION INTRAVENOUS EVERY 4 HOURS
Status: DISCONTINUED | OUTPATIENT
Start: 2020-10-16 | End: 2020-10-16

## 2020-10-16 RX ORDER — ACETAMINOPHEN 325 MG/1
650 TABLET ORAL EVERY 4 HOURS PRN
Status: DISCONTINUED | OUTPATIENT
Start: 2020-10-16 | End: 2020-10-16

## 2020-10-16 RX ORDER — METHYLERGONOVINE MALEATE 0.2 MG/ML
200 INJECTION INTRAVENOUS PRN
Status: DISCONTINUED | OUTPATIENT
Start: 2020-10-16 | End: 2020-10-16

## 2020-10-16 RX ADMIN — ONDANSETRON 8 MG: 4 TABLET, ORALLY DISINTEGRATING ORAL at 19:13

## 2020-10-16 RX ADMIN — Medication: at 17:00

## 2020-10-16 RX ADMIN — SODIUM CHLORIDE, POTASSIUM CHLORIDE, SODIUM LACTATE AND CALCIUM CHLORIDE: 600; 310; 30; 20 INJECTION, SOLUTION INTRAVENOUS at 11:05

## 2020-10-16 RX ADMIN — Medication 1 MILLI-UNITS/MIN: at 11:32

## 2020-10-16 RX ADMIN — IBUPROFEN 800 MG: 800 TABLET, FILM COATED ORAL at 20:42

## 2020-10-16 RX ADMIN — SODIUM CHLORIDE, POTASSIUM CHLORIDE, SODIUM LACTATE AND CALCIUM CHLORIDE: 600; 310; 30; 20 INJECTION, SOLUTION INTRAVENOUS at 13:35

## 2020-10-16 RX ADMIN — PENICILLIN G 3 MILLION UNITS: 3000000 INJECTION, SOLUTION INTRAVENOUS at 15:28

## 2020-10-16 RX ADMIN — DEXTROSE MONOHYDRATE 5 MILLION UNITS: 5 INJECTION INTRAVENOUS at 11:15

## 2020-10-16 RX ADMIN — Medication 18 ML/HR: at 13:45

## 2020-10-16 ASSESSMENT — PAIN SCALES - GENERAL: PAINLEVEL_OUTOF10: 7

## 2020-10-16 ASSESSMENT — PAIN DESCRIPTION - DESCRIPTORS: DESCRIPTORS: ACHING;CRAMPING

## 2020-10-16 ASSESSMENT — LIFESTYLE VARIABLES: SMOKING_STATUS: 1

## 2020-10-16 NOTE — L&D DELIVERY NOTE
Department of Obstetrics and Gynecology  Spontaneous Vaginal Delivery Note      Pt Name: Carlos Izquierdo  MRN: [id number] Acct #: [de-identified]  YOB: 1990  Procedure Performed By: Noemi Puentes MD        Pre-operative Diagnosis:  Term pregnancy, Induced labor, Single fetus and Pregnancy complicated by: Gestational HTN    Post-operative Diagnosis: Same, delivered. Procedure: spontaneous vaginal delivery    Surgeon:  Neil Srivastava    Information for the patient's :  Dang Nunez [[id number]]          Anesthesia:  epidural anesthesia    Estimated blood loss:  402 ml    Complications:  none    Condition:  infant stable to general nursery and mother stable    Delivery Summary:  The patient is a 31 y.o. [id number] at 42w2d who was admitted for gestational hypertension. She received the following interventions: ARBOW and IV Pitocin induction. The patient progressed well,did receive an epidural, became complete and started to push. She was placed in the dorsal lithotomy position and prepped. She delivered the baby which was then placed on the mother's abdomen. Cord was clamped and cut and infant handed off to the waiting nurse for evaluation. The placenta delivered intact, whole and that the umbilical cord had three vessels noted. The perineum and vagina were explored and no laceration was repaired with Vicryl 3.0. Vaginal sweep was performed at the conclusion of delivery and all needles were taken off the field.  Sponge counts correct      PMH:  Past Medical History:   Diagnosis Date    Abnormal Pap smear of cervix     Anxiety     Anxiety disorder     Depression     Hypertension     in the last few weeks    Sabrina Puentes

## 2020-10-16 NOTE — FLOWSHEET NOTE
Pt was seen by Alvaro Horton in the office and consulted with Dr Juan Card. Dr Juan Card phoned unit with orders and states pt was coming over for induction.

## 2020-10-16 NOTE — FLOWSHEET NOTE
Dr Alena Medley and [facility] team called for delivery. MD at nurses station.

## 2020-10-16 NOTE — PLAN OF CARE
Problem: Pain:  Goal: Pain level will decrease  Description: Pain level will decrease  10/16/2020 1158 by Hortensia Saucedo RN  Outcome: Ongoing   Denies pain, planning labor epidural, pain goal 5  Problem: Anxiety:  Goal: Level of anxiety will decrease  Description: Level of anxiety will decrease  Outcome: Ongoing   calm  Problem: Breathing Pattern - Ineffective:  Goal: Able to breathe comfortably  Description: Able to breathe comfortably  Outcome: Ongoing   Respirations regular and easy  Problem: Fluid Volume - Imbalance:  Goal: Absence of intrapartum hemorrhage signs and symptoms  Description: Absence of intrapartum hemorrhage signs and symptoms  Outcome: Ongoing   stable  Problem: Infection - Intrapartum Infection:  Goal: Will show no infection signs and symptoms  Description: Will show no infection signs and symptoms  Outcome: Ongoing   afebrile  Problem: Labor Process - Prolonged:  Goal: Uterine contractions within specified parameters  Description: Uterine contractions within specified parameters  Outcome: Ongoing   Denies ryne  Problem: Tissue Perfusion - Uteroplacental, Altered:  Goal: Absence of abnormal fetal heart rate pattern  Description: Absence of abnormal fetal heart rate pattern  Outcome: Ongoing   Reactive fht's  Problem: Urinary Retention:  Goal: Urinary elimination within specified parameters  Description: Urinary elimination within specified parameters  Outcome: Ongoing   Bathroom priviliges  Problem: Falls - Risk of:  Goal: Will remain free from falls  Description: Will remain free from falls  Outcome: Ongoing   No falls  Problem: Discharge Planning:  Goal: Discharged to appropriate level of care  Description: Discharged to appropriate level of care  Outcome: Ongoing   Verbalizes understanding of discharge from l/d after 2 hour recovery  Care plan reviewed with chichi and Camilo. Patient and Camilo verbalize understanding of the plan of care and contribute to goal setting.

## 2020-10-16 NOTE — PLAN OF CARE
Problem: Pain:  Goal: Pain level will decrease  Description: Denies pain and has epdiural and pain goal is 7.  10/16/2020 1630 by Jamar Whatley RN  Outcome: Ongoing  10/16/2020 1158 by Nuha Sommers RN  Outcome: Ongoing  10/16/2020 1157 by Nuha Sommers RN  Outcome: Ongoing     Problem: Anxiety:  Goal: Level of anxiety will decrease  Description: Plan of care discussed and denies questions and encouraged to ask questions if needed. 10/16/2020 1630 by Jamar Whatley RN  Outcome: Ongoing  10/16/2020 1158 by Nuha Sommers RN  Outcome: Ongoing     Problem: Breathing Pattern - Ineffective:  Goal: Able to breathe comfortably  Description: Able to breathe comfortably  10/16/2020 1630 by Jamar Whatley RN  Outcome: Ongoing  10/16/2020 1158 by Nuha Sommers RN  Outcome: Ongoing     Problem: Fluid Volume - Imbalance:  Goal: Absence of intrapartum hemorrhage signs and symptoms  Description: Absence of intrapartum hemorrhage signs and symptoms  10/16/2020 1630 by Jamar Whatley RN  Outcome: Ongoing  10/16/2020 1158 by Nuha Sommers RN  Outcome: Ongoing     Problem: Infection - Intrapartum Infection:  Goal: Will show no infection signs and symptoms  Description: Temperature is 100.2 and leaking clear fluid vaginally. 10/16/2020 1630 by Jamar Whatley RN  Outcome: Ongoing  10/16/2020 1158 by Nuha Sommers RN  Outcome: Ongoing     Problem: Labor Process - Prolonged:  Goal: Uterine contractions within specified parameters  Description: Contractions are 2-3 minutes apart and have dilated cervix to 7 cms and is in active labor on pitocin. 10/16/2020 1630 by Jamar Whatley RN  Outcome: Ongoing  10/16/2020 1158 by Nuha Sommers RN  Outcome: Ongoing     Problem: Pain - Acute:  Goal: Able to cope with pain  Description: Denies pain and has labor epidural and is 7 cms.   10/16/2020 1630 by Jamar Whatley RN  Outcome: Ongoing  10/16/2020 1158 by Nuha Sommers RN  Outcome: Ongoing     Problem: Tissue Perfusion - Uteroplacental, Altered:  Goal: Absence of abnormal fetal heart rate pattern  Description: Has reactive fetal tracing with some variable decelerations. 10/16/2020 1630 by Arsen Gibbs RN  Outcome: Ongoing  10/16/2020 1158 by Griselda Golden RN  Outcome: Ongoing     Problem: Urinary Retention:  Goal: Urinary elimination within specified parameters  Description: Hernandez is patent and drains urine well. 10/16/2020 1630 by Arsen Gibbs RN  Outcome: Ongoing  10/16/2020 1158 by Griselda Golden RN  Outcome: Ongoing     Problem: Falls - Risk of:  Goal: Will remain free from falls  Description: On bedrest and no falls or injury and family is supportive at side. 10/16/2020 1630 by Arsen Gibbs RN  Outcome: Ongoing  10/16/2020 1158 by Griselda Gloden RN  Outcome: Ongoing     Problem: Discharge Planning:  Goal: Discharged to appropriate level of care  Description: Discussed discharge from labor and delivery 2 hours after delivery. 10/16/2020 1630 by Arsen Gibbs RN  Outcome: Ongoing  10/16/2020 1158 by Griselda Golden RN  Outcome: Ongoing     Problem: Pain:  Goal: Control of acute pain  Description: Control of acute pain  10/16/2020 1157 by Griselda Golden RN  Outcome: Completed  Goal: Control of chronic pain  Description: Control of chronic pain  10/16/2020 1157 by Griselda Golden RN  Outcome: Completed     Problem: Pain:  Goal: Control of acute pain  Description: Control of acute pain  10/16/2020 1157 by Griselda Golden RN  Outcome: Completed     Problem: Pain:  Goal: Control of acute pain  Description: Control of acute pain  10/16/2020 1157 by Griselda Golden RN  Outcome: Completed   Care plan reviewed with patient and Willow Arzate and they verbalize understanding of the plan of care and contribute to goal setting.

## 2020-10-16 NOTE — H&P
[facility]  History and Physical Update    Pt Name: Sabrina Almazan  MRN: [id number]  YOB: 1990  Date of evaluation: 10/16/2020    [] I have examined the patient and reviewed the H&P/Consult and there are no changes to the patient or plans. [x] I have examined the patient and reviewed the H&P/Consult and have noted the following changes:   Pt with GHTN borderline proteinuria and off and on HA.  at 37/2 weeks she has been very noncompliant with getting her 3hr GTT done and finally did check sugars for a week which all were within goal. FHT category 1. Plan for IOL with pitocin and AROM for GHTN at term.         Ledy Mcgee MD  Electronically signed 10/16/2020 at 11:04 AM

## 2020-10-16 NOTE — ANESTHESIA PRE PROCEDURE
Department of Anesthesiology  Preprocedure Note       Name:  Yamilex Monsivais   Age:  31 y.o.  :  1990                                          MRN:  [id number]         Date:  10/16/2020      Surgeon: * No surgeons listed *    Procedure: * No procedures listed *    Medications prior to admission:   Prior to Admission medications    Medication Sig Start Date End Date Taking?  Authorizing Provider   Prenatal Vit-Fe Fumarate-FA (PRENATAL PO) Take by mouth daily   Yes Historical Provider, MD       Current medications:    Current Facility-Administered Medications   Medication Dose Route Frequency Provider Last Rate Last Dose    lactated ringers infusion   Intravenous Continuous Rosy Burnette  mL/hr at 10/16/20 1105      lidocaine PF 1 % injection 30 mL  30 mL Other PRN Rosy Burnette MD        butorphanol (STADOL) injection 1 mg  1 mg Intravenous Q2H PRN Rosy Burnette MD        acetaminophen (TYLENOL) tablet 650 mg  650 mg Oral Q4H PRN Rosy Burnette MD        ibuprofen (ADVIL;MOTRIN) tablet 800 mg  800 mg Oral Q8H PRN Rosy Burnette MD        oxytocin (PITOCIN) 30 units in 500 mL infusion  1 maximiliano-units/min Intravenous Continuous Rosy Burnette MD 4 mL/hr at 10/16/20 1232 4 maximiliano-units/min at 10/16/20 1232    diphenhydrAMINE (BENADRYL) injection 25 mg  25 mg Intravenous Q4H PRN Rosy Burnette MD        docusate sodium (COLACE) capsule 100 mg  100 mg Oral BID PRN Rosy Burnette MD        ondansetron [facility]) injection 8 mg  8 mg Intravenous Q6H PRN Rosy Burnette MD        terbutaline (BRETHINE) injection 0.25 mg  0.25 mg Subcutaneous Once Rosy Burnette MD        oxytocin (PITOCIN) 10 unit bolus from the bag  10 Units Intravenous PRN Rosy Burnette MD        And    oxytocin (PITOCIN) 30 units in 500 mL infusion  95 maximiliano-units/min Intravenous PRN Rosy Burnette MD        methylergonovine (METHERGINE) injection 200 mcg  200 mcg Intramuscular PRN Rosy Burnette MD       [facility] carboprost (HEMABATE) injection 250 mcg  250 mcg Intramuscular PRN Altagracia Mahajan MD        miSOPROStol (CYTOTEC) tablet 1,000 mcg  1,000 mcg Rectal PRN Altagracia Mahajan MD        witch hazel-glycerin (TUCKS) pad   Topical PRN Altagracia Mahajan MD        benzocaine-menthol (DERMOPLAST) 20-0.5 % spray   Topical PRN Altagracia Mahajan MD        penicillin G potassium IVPB 3 Million Units  3 Million Units Intravenous Q4H Altagracia Mahajan MD           Allergies: Allergies   Allergen Reactions    Latex Rash    Iodine Nausea And Vomiting       Problem List:    Patient Active Problem List   Diagnosis Code    False labor O47.9    Normal  [de-identified]    Encounter for induction of labor Z34.90       Past Medical History:        Diagnosis Date    Abnormal Pap smear of cervix     Anxiety     Anxiety disorder     Depression     Hypertension     in the last few weeks    Migraine        Past Surgical History:  History reviewed. No pertinent surgical history. Social History:    Social History     Tobacco Use    Smoking status: Current Every Day Smoker     Packs/day: 0.50     Types: Cigarettes    Smokeless tobacco: Never Used   Substance Use Topics    Alcohol use:  No                                Ready to quit: Not Answered  Counseling given: Not Answered      Vital Signs (Current):   Vitals:    10/16/20 1202 10/16/20 1232 10/16/20 1302 10/16/20 1330   BP: (!) 160/95 132/81 129/62 137/82   Pulse: 85 87 85    Resp: 18 18 18    Temp:   36.8 °C (98.2 °F)    TempSrc:       SpO2:       Weight:       Height:                                                  BP Readings from Last 3 Encounters:   10/16/20 137/82   20 (!) 127/91   10/30/19 137/80       NPO Status: Time of last liquid consumption: 030                        Time of last solid consumption: 300                        Date of last liquid consumption: 10/16/20                        Date of last solid food consumption: 10/16/20    BMI:   Wt Anesthesia Plan      epidural     ASA 2             Anesthetic plan and risks discussed with patient. Plan discussed with attending.                   Dain Kayser, APRN - V[facility]U   10/16/2020

## 2020-10-16 NOTE — ANESTHESIA PROCEDURE NOTES
Epidural Block    Patient location during procedure: OB  Reason for block: labor epidural  Staffing  Anesthesiologist: Julieta Anderson DO  Resident/CRNA: CHANDRAKANT Villalobos - CRNA  Performed: resident/CRNA   Preanesthetic Checklist  Completed: patient identified, site marked, surgical consent, pre-op evaluation, timeout performed, IV checked, risks and benefits discussed, monitors and equipment checked, anesthesia consent given, oxygen available and patient being monitored  Epidural  Patient position: sitting  Prep: ChloraPrep  Patient monitoring: frequent blood pressure checks and continuous pulse ox  Approach: midline  Location: lumbar (1-5)  Injection technique: CELIA air  Provider prep: mask and sterile gloves  Needle  Needle gauge: 18 G  Needle length: 3.5 in  Needle insertion depth: 6 cm  Catheter type: side hole  Catheter size: 18 G  Catheter at skin depth: 11 cm  Test dose: negative  Assessment  Hemodynamics: stable  Attempts: 1

## 2020-10-16 NOTE — FLOWSHEET NOTE
Pt arrives for scheduled induction, , edc 20, 37 2/7 weeks, gestational hypertension, +gbs, pt denies headache, but did have headache today, denies visual changes and epigastric pain, denies cramping and ryne, denies rom and vaginal bleeding, has been feeling baby move, Patient to bathroom to void, informed of maternal drug testing policy in place on all laboring patients. Verbal consent received, paper consent to be signed and urine to be sent. Explained patients right to have family, representative or physician notified of their admission. Patient has Declined for physician to be notified. Patient has Declined for family/representative to be notified.

## 2020-10-17 VITALS
HEIGHT: 63 IN | OXYGEN SATURATION: 99 % | TEMPERATURE: 97.3 F | SYSTOLIC BLOOD PRESSURE: 111 MMHG | DIASTOLIC BLOOD PRESSURE: 61 MMHG | BODY MASS INDEX: 32.78 KG/M2 | HEART RATE: 86 BPM | WEIGHT: 185 LBS | RESPIRATION RATE: 18 BRPM

## 2020-10-17 LAB
ABO: NORMAL
ANTIBODY SCREEN: NORMAL
FETAL SCREEN: NORMAL
GESTATIONAL AGE(WEEKS): NORMAL
INDIRECT COOMBS: NORMAL
RH FACTOR: NORMAL

## 2020-10-17 PROCEDURE — 36415 COLL VENOUS BLD VENIPUNCTURE: CPT

## 2020-10-17 PROCEDURE — 6360000002 HC RX W HCPCS: Performed by: OBSTETRICS & GYNECOLOGY

## 2020-10-17 PROCEDURE — 85461 HEMOGLOBIN FETAL: CPT

## 2020-10-17 PROCEDURE — 86850 RBC ANTIBODY SCREEN: CPT

## 2020-10-17 PROCEDURE — 86901 BLOOD TYPING SEROLOGIC RH(D): CPT

## 2020-10-17 PROCEDURE — 6370000000 HC RX 637 (ALT 250 FOR IP): Performed by: OBSTETRICS & GYNECOLOGY

## 2020-10-17 PROCEDURE — 86885 COOMBS TEST INDIRECT QUAL: CPT

## 2020-10-17 PROCEDURE — 86900 BLOOD TYPING SEROLOGIC ABO: CPT

## 2020-10-17 RX ADMIN — IBUPROFEN 800 MG: 800 TABLET, FILM COATED ORAL at 15:00

## 2020-10-17 RX ADMIN — DOCUSATE SODIUM 100 MG: 100 CAPSULE, LIQUID FILLED ORAL at 08:01

## 2020-10-17 RX ADMIN — FERROUS SULFATE TAB 325 MG (65 MG ELEMENTAL FE) 325 MG: 325 (65 FE) TAB at 08:01

## 2020-10-17 RX ADMIN — IBUPROFEN 800 MG: 800 TABLET, FILM COATED ORAL at 05:42

## 2020-10-17 RX ADMIN — HUMAN RHO(D) IMMUNE GLOBULIN 300 MCG: 300 INJECTION, SOLUTION INTRAMUSCULAR at 14:52

## 2020-10-17 RX ADMIN — ACETAMINOPHEN 650 MG: 325 TABLET ORAL at 08:31

## 2020-10-17 ASSESSMENT — PAIN SCALES - GENERAL
PAINLEVEL_OUTOF10: 8
PAINLEVEL_OUTOF10: 4
PAINLEVEL_OUTOF10: 8

## 2020-10-17 NOTE — FLOWSHEET NOTE
Up to bathroom voided well, small amount of rubra lochia. Debi care instructions given, verbalized understanding. Fundus U1 centered and firm. Ambulated well. Denies further needs at this. IV discontinued, catheter intact, tolerated well. Denies further needs at this time.

## 2020-10-17 NOTE — PLAN OF CARE
Problem: Pain:  Goal: Pain level will decrease  Description: Denies pain and has epdiural and pain goal is 7.  10/16/2020 2134 by Saul Patel RN  Outcome: Ongoing  Note: Pain controlled with ice, heat, rest, reposition, and/or pain medication. Pain goal discussed with patient. Patient satisfied with pain interventions. Problem: Fluid Volume - Imbalance:  Goal: Absence of intrapartum hemorrhage signs and symptoms  Description: Absence of intrapartum hemorrhage signs and symptoms  10/16/2020 2134 by Saul Patel RN  Outcome: Ongoing  Note: Fundus firm. Small amount of rubra lochia. Problem: Infection - Intrapartum Infection:  Goal: Will show no infection signs and symptoms  Description: Temperature is 100.2 and leaking clear fluid vaginally. 10/16/2020 2134 by Saul Patel RN  Outcome: Ongoing  Note: Afebrile, vital signs stable. No signs or symptoms of infection. Problem: Pain - Acute:  Goal: Pain level will decrease  Description: Denies pain and has epdiural and pain goal is 7.  10/16/2020 2134 by Saul Patel RN  Outcome: Ongoing  Note: Pain controlled with ice, heat, rest, reposition, and/or pain medication. Pain goal discussed with patient. Patient satisfied with pain interventions. Problem: Discharge Planning:  Goal: Discharged to appropriate level of care  Description: Discharged to appropriate level of care  Outcome: Ongoing  Note: Working towards discharge. Problem: Constipation:  Goal: Bowel elimination is within specified parameters  Description: Bowel elimination is within specified parameters  Outcome: Ongoing  Note: Stool softener taken as needed. Increasing fluids and fiber. Problem: Infection - Risk of, Puerperal Infection:  Goal: Will show no infection signs and symptoms  Description: Temperature is 100.2 and leaking clear fluid vaginally. 10/16/2020 2134 by Saul Patel RN  Outcome: Ongoing  Note: Afebrile, vital signs stable.  No signs or symptoms of infection. Problem: Mood - Altered:  Goal: Mood stable  Description: Mood stable  Note: Calm and cooperative. Care plan reviewed with patient. Patient verbalize understanding of the plan of care and contribute to goal setting.

## 2020-10-17 NOTE — FLOWSHEET NOTE
Postpartum  teaching completed and forms signed by patient. Copy witnessed by RN and given to patient. Patient verbalized understanding of all teaching points. Patient plans to follow-up with [facility] Provider as instructed. Patient verbalizes understanding of discharge instructions and denies further questions. ID bands checked. Patient discharged in stable condition accompanied by family/guardian. Discharged in wheelchair, holding baby in arms.

## 2020-10-17 NOTE — FLOWSHEET NOTE
Up to bathroom voided well, small amount of rubra lochia. Debi care instructions given, verbalized understanding. Fundus U1 centered and firm. Ambulated well. Due to void times 1. Denies further needs at this. Admitted to room 21  Via wheel chair from L&D per TANYA Recio RN. Assessment complete, patient oriented to room. Plan of care discussed. Due to void times 1. Denies further needs at this time.

## 2020-10-17 NOTE — ANESTHESIA POSTPROCEDURE EVALUATION
Department of Anesthesiology  Postprocedure Note    Patient: Anh Singh  MRN: [id number]  YOB: 1990  Date of evaluation: 10/17/2020  Time:  3:03 PM     Procedure Summary     Date:  10/16/20 Room / Location:      Anesthesia Start:  9300 Anesthesia Stop:  0816    Procedure:  Labor Analgesia Diagnosis:      Scheduled Providers:   Responsible Provider:  Darby Bower DO    Anesthesia Type:  epidural ASA Status:  2          Anesthesia Type: epidural    Anibal Phase I: Anibal Score: 10    Anibal Phase II: Anibal Score: 10    Last vitals: Reviewed and per EMR flowsheets.        Anesthesia Post Evaluation    Patient location during evaluation: floor  Patient participation: complete - patient participated  Level of consciousness: awake and alert  Airway patency: patent  Nausea & Vomiting: no nausea and no vomiting  Complications: no  Cardiovascular status: hemodynamically stable  Respiratory status: acceptable and spontaneous ventilation  Hydration status: euvolemic

## 2020-10-17 NOTE — PROGRESS NOTES
Department of Obstetrics and Gynecology  Labor and Delivery  Attending Post Partum Progress Note    PPD #1    SUBJECTIVE: Feeling well. No complaints. OBJECTIVE:     Vitals:  BP (!) 157/88   Pulse 80   Temp 97.8 °F (36.6 °C) (Oral)   Resp 18   Ht 5' 3\" (1.6 m)   Wt 185 lb (83.9 kg)   SpO2 99%   Breastfeeding Unknown   BMI 32.77 kg/m²     Uterus:  normal size, well involuted, firm, non-tender    DATA:    Hemoglobin:   Lab Results   Component Value Date    HGB 8.4 10/16/2020       ASSESSMENT & PLAN:  Doing well.    Pt requesting home today if possible    Gaile Courts 10/17/2020

## 2020-10-17 NOTE — DISCHARGE SUMMARY
Vaginal Delivery Discharge Summary    Gestational Age:37w2d    Antepartum complications: Gestational HTN    Admission date: 10/16/2020 10:52 AM      Type of Delivery:      [place] Data  Information for the patient's :  Richard Wilson [[id number]]   male   Birth Weight: 6 lb 6.8 oz (2.915 kg)       Labs: CBC   Lab Results   Component Value Date    HGB 8.4 (L) 10/16/2020    HCT 26.7 (L) 10/16/2020        Intrapartum complications: None    Postpartum complications: none    The patient is ambulating well. The patient is tolerating a normal diet. Patient Instructions: Activity: activity as tolerated and no sex for 6 weeks  Diet: regular  Wound Care: as directed    Discharge Information  Current Discharge Medication List      CONTINUE these medications which have NOT CHANGED    Details   Prenatal Vit-Fe Fumarate-FA (PRENATAL PO) Take by mouth daily             No discharge procedures on file.     Condition: Good    Plan:   Follow up in 5 week(s)    Electronically signed by Rob Jiménez MD on 10/17/2020 at 9:11 AM